# Patient Record
Sex: MALE | Race: WHITE | NOT HISPANIC OR LATINO | Employment: UNEMPLOYED | URBAN - METROPOLITAN AREA
[De-identification: names, ages, dates, MRNs, and addresses within clinical notes are randomized per-mention and may not be internally consistent; named-entity substitution may affect disease eponyms.]

---
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
## 2018-03-19 ENCOUNTER — HOSPITAL ENCOUNTER (EMERGENCY)
Facility: HOSPITAL | Age: 18
Discharge: HOME | End: 2018-03-19
Attending: EMERGENCY MEDICINE
Payer: MEDICAID

## 2018-03-19 VITALS
HEIGHT: 64 IN | RESPIRATION RATE: 12 BRPM | BODY MASS INDEX: 22.2 KG/M2 | DIASTOLIC BLOOD PRESSURE: 92 MMHG | WEIGHT: 130 LBS | OXYGEN SATURATION: 100 % | TEMPERATURE: 98 F | HEART RATE: 60 BPM | SYSTOLIC BLOOD PRESSURE: 136 MMHG

## 2018-03-19 DIAGNOSIS — R07.9 CHEST PAIN: Primary | ICD-10-CM

## 2018-03-19 PROCEDURE — 93005 ELECTROCARDIOGRAM TRACING: CPT

## 2018-03-19 PROCEDURE — 99284 EMERGENCY DEPT VISIT MOD MDM: CPT

## 2018-03-19 PROCEDURE — 25000003 PHARM REV CODE 250: Performed by: PHYSICIAN ASSISTANT

## 2018-03-19 RX ADMIN — LIDOCAINE HYDROCHLORIDE: 20 SOLUTION ORAL; TOPICAL at 10:03

## 2018-03-19 NOTE — ED NOTES
"C/o chest tightness and "burning" pain behind his chest for the past 3.5 hrs after waking up and drinking fluids including blue icee which made it worse. Denies other S/SX. Even non labored respirations. Friend remains at bedside aware to notify nurse of needs or concerns.   "

## 2018-03-19 NOTE — ED PROVIDER NOTES
"Encounter Date: 3/19/2018       History     Chief Complaint   Patient presents with    Chest Pain     R anterior chest since 0630 today.     Patient is a 18 year old male who presents with chest pain for 3.5 hours PTA. He reports no PMH. He states he woke up feeling thirsty and felt some mild chest pain. He drank something and the "pain progressed". He reports "It feels like a burning behind my chest". He states it has been constant since then. He states mild shortness of breath. He denied family history of heart disease. He denied leg swelling, calf tenderness, fever, chills, cough or recent injury. He denied any allevaiting or exacerbating symptoms.          Review of patient's allergies indicates:   Allergen Reactions    Cefzil [cefprozil] Hives and Rash    Pcn [penicillins] Hives and Rash    Sulfa (sulfonamide antibiotics) Hives and Rash     History reviewed. No pertinent past medical history.  Past Surgical History:   Procedure Laterality Date    tubes in ears  2000     History reviewed. No pertinent family history.  Social History   Substance Use Topics    Smoking status: Current Every Day Smoker     Types: Vaping with nicotine    Smokeless tobacco: Never Used    Alcohol use No     Review of Systems   Constitutional: Negative for activity change, appetite change, chills and fever.   HENT: Negative for congestion, rhinorrhea and sore throat.    Eyes: Negative for redness and visual disturbance.   Respiratory: Positive for chest tightness and shortness of breath. Negative for cough.    Cardiovascular: Negative for chest pain, palpitations and leg swelling.   Gastrointestinal: Negative for abdominal pain, diarrhea, nausea and vomiting.   Genitourinary: Negative for dysuria and frequency.   Musculoskeletal: Negative for back pain, neck pain and neck stiffness.   Skin: Negative for rash.   Neurological: Negative for dizziness, syncope, numbness and headaches.       Physical Exam     Initial Vitals [03/19/18 " "0949]   BP Pulse Resp Temp SpO2   (!) 136/92 60 12 98.2 °F (36.8 °C) 100 %      MAP       106.67         Physical Exam    Constitutional: Vital signs are normal. He appears well-developed and well-nourished. He is cooperative.  Non-toxic appearance. He does not have a sickly appearance.   HENT:   Head: Normocephalic and atraumatic.   Right Ear: External ear normal.   Left Ear: External ear normal.   Nose: Nose normal.   Mouth/Throat: Oropharynx is clear and moist.   Eyes: Conjunctivae and lids are normal. Pupils are equal, round, and reactive to light.   Neck: Normal range of motion and full passive range of motion without pain. Neck supple.   Cardiovascular: Normal rate, regular rhythm and normal heart sounds. Exam reveals no gallop and no friction rub.    No murmur heard.  Pulmonary/Chest: Breath sounds normal. He has no wheezes. He has no rhonchi. He has no rales.   Abdominal: Soft. Normal appearance. There is no tenderness. There is no rigidity, no rebound and no guarding.   Musculoskeletal:   No lower extremity swelling. No calf tenderness. Negative homans sign bilaterally.    Neurological: He is alert and oriented to person, place, and time.   Skin: Skin is warm, dry and intact. No rash noted.         ED Course   Procedures  Labs Reviewed - No data to display          Medical Decision Making:   History:   I obtained history from: someone other than patient.  Old Medical Records: I decided to obtain old medical records.  Clinical Tests:   Radiological Study: Ordered and Reviewed  Medical Tests: Ordered and Reviewed       APC / Resident Notes:   Emergent evaluation of a 18-year-old male who presents with midsternal chest pain that feels like a "burning behind my heart".  He reports mild shortness of breath.  He is well-appearing.  He is not to.  His oxygen saturation is stable.  His breath sounds are clear and equal bilaterally.  I doubt pneumonia or pneumothorax.  EKG shows no acute changes.  Chest x-ray is " negative.  He was given a GI cocktail with resolution of his symptoms.  He denied family history of sudden cardiac death at a young age.  I doubt ACS.  Will start patient on Zantac. Discussed results with patient. Return precautions given. Based on my clinical evaluation, I do not appreciate any immediate, emergent, or life threatening condition or etiology that warrants additional workup today and feel that the patient can be discharged with close follow up care.  Patient is to follow up with their primary care provider. Case was discussed with Dr. Garcia who is in agreement with the plan of care. All questions answered.            Attending Attestation:     Physician Attestation Statement for NP/PA:   I discussed this assessment and plan of this patient with the NP/PA, but I did not personally examine the patient. The face to face encounter was performed by the NP/PA.                     Clinical Impression:   The encounter diagnosis was Chest pain.                           EBONY GrullonC  03/19/18 1552       Jomar Garcia MD  03/23/18 0789

## 2019-02-24 PROBLEM — M25.532 PAIN IN BOTH WRISTS: Status: ACTIVE | Noted: 2019-02-24

## 2019-02-24 PROBLEM — M25.531 PAIN IN BOTH WRISTS: Status: ACTIVE | Noted: 2019-02-24

## 2019-02-24 PROBLEM — G56.03 CARPAL TUNNEL SYNDROME, BILATERAL: Status: ACTIVE | Noted: 2019-02-24

## 2020-04-03 ENCOUNTER — TELEPHONE (OUTPATIENT)
Dept: FAMILY MEDICINE | Facility: CLINIC | Age: 20
End: 2020-04-03

## 2020-04-03 NOTE — TELEPHONE ENCOUNTER
----- Message from Mauro Edwards sent at 4/3/2020  9:04 AM CDT -----  Contact: pt   Type: Needs Medical Advice    Who Called:  Pt   Symptoms (please be specific):    How long has patient had these symptoms:    Pharmacy name and phone #:    Best Call Back Number: [phone]   Additional Information: need a referral for a hand specialist

## 2020-09-08 PROBLEM — G56.01 CARPAL TUNNEL SYNDROME, RIGHT: Status: ACTIVE | Noted: 2020-09-08

## 2021-06-30 ENCOUNTER — OFFICE VISIT (OUTPATIENT)
Dept: FAMILY MEDICINE | Facility: CLINIC | Age: 21
End: 2021-06-30
Payer: MEDICAID

## 2021-06-30 VITALS
SYSTOLIC BLOOD PRESSURE: 128 MMHG | BODY MASS INDEX: 25.77 KG/M2 | WEIGHT: 154.69 LBS | HEART RATE: 68 BPM | HEIGHT: 65 IN | DIASTOLIC BLOOD PRESSURE: 88 MMHG | OXYGEN SATURATION: 98 %

## 2021-06-30 DIAGNOSIS — M25.551 RIGHT HIP PAIN: Primary | ICD-10-CM

## 2021-06-30 PROCEDURE — 99204 OFFICE O/P NEW MOD 45 MIN: CPT | Performed by: NURSE PRACTITIONER

## 2021-06-30 PROCEDURE — 99213 OFFICE O/P EST LOW 20 MIN: CPT | Mod: S$PBB,,, | Performed by: NURSE PRACTITIONER

## 2021-06-30 PROCEDURE — 99213 PR OFFICE/OUTPT VISIT, EST, LEVL III, 20-29 MIN: ICD-10-PCS | Mod: S$PBB,,, | Performed by: NURSE PRACTITIONER

## 2024-12-09 ENCOUNTER — LAB VISIT (OUTPATIENT)
Dept: LAB | Facility: HOSPITAL | Age: 24
End: 2024-12-09
Payer: COMMERCIAL

## 2024-12-09 ENCOUNTER — OFFICE VISIT (OUTPATIENT)
Dept: FAMILY MEDICINE | Facility: CLINIC | Age: 24
End: 2024-12-09
Payer: COMMERCIAL

## 2024-12-09 VITALS
HEART RATE: 48 BPM | DIASTOLIC BLOOD PRESSURE: 76 MMHG | OXYGEN SATURATION: 99 % | HEIGHT: 65 IN | WEIGHT: 162.25 LBS | BODY MASS INDEX: 27.03 KG/M2 | RESPIRATION RATE: 18 BRPM | SYSTOLIC BLOOD PRESSURE: 122 MMHG

## 2024-12-09 DIAGNOSIS — G89.29 CHRONIC PAIN OF BOTH HIPS: ICD-10-CM

## 2024-12-09 DIAGNOSIS — R03.0 ELEVATED BLOOD PRESSURE READING WITHOUT DIAGNOSIS OF HYPERTENSION: ICD-10-CM

## 2024-12-09 DIAGNOSIS — M25.552 CHRONIC PAIN OF BOTH HIPS: ICD-10-CM

## 2024-12-09 DIAGNOSIS — R00.1 BRADYCARDIA: ICD-10-CM

## 2024-12-09 DIAGNOSIS — Z00.00 WELLNESS EXAMINATION: Primary | ICD-10-CM

## 2024-12-09 DIAGNOSIS — M25.551 CHRONIC PAIN OF BOTH HIPS: ICD-10-CM

## 2024-12-09 DIAGNOSIS — G43.719 INTRACTABLE CHRONIC MIGRAINE WITHOUT AURA AND WITHOUT STATUS MIGRAINOSUS: ICD-10-CM

## 2024-12-09 DIAGNOSIS — Z00.00 WELLNESS EXAMINATION: ICD-10-CM

## 2024-12-09 LAB
ALBUMIN SERPL BCP-MCNC: 4 G/DL (ref 3.5–5.2)
ALP SERPL-CCNC: 41 U/L (ref 40–150)
ALT SERPL W/O P-5'-P-CCNC: 14 U/L (ref 10–44)
ANION GAP SERPL CALC-SCNC: 11 MMOL/L (ref 8–16)
AST SERPL-CCNC: 20 U/L (ref 10–40)
BASOPHILS # BLD AUTO: 0.05 K/UL (ref 0–0.2)
BASOPHILS NFR BLD: 0.7 % (ref 0–1.9)
BILIRUB SERPL-MCNC: 0.5 MG/DL (ref 0.1–1)
BUN SERPL-MCNC: 17 MG/DL (ref 6–20)
CALCIUM SERPL-MCNC: 9.1 MG/DL (ref 8.7–10.5)
CHLORIDE SERPL-SCNC: 106 MMOL/L (ref 95–110)
CHOLEST SERPL-MCNC: 176 MG/DL (ref 120–199)
CHOLEST/HDLC SERPL: 3.6 {RATIO} (ref 2–5)
CO2 SERPL-SCNC: 25 MMOL/L (ref 23–29)
CREAT SERPL-MCNC: 1 MG/DL (ref 0.5–1.4)
DIFFERENTIAL METHOD BLD: ABNORMAL
EOSINOPHIL # BLD AUTO: 0.2 K/UL (ref 0–0.5)
EOSINOPHIL NFR BLD: 2.2 % (ref 0–8)
ERYTHROCYTE [DISTWIDTH] IN BLOOD BY AUTOMATED COUNT: 11.9 % (ref 11.5–14.5)
EST. GFR  (NO RACE VARIABLE): >60 ML/MIN/1.73 M^2
ESTIMATED AVG GLUCOSE: 97 MG/DL (ref 68–131)
GLUCOSE SERPL-MCNC: 85 MG/DL (ref 70–110)
HBA1C MFR BLD: 5 % (ref 4–5.6)
HCT VFR BLD AUTO: 43.1 % (ref 40–54)
HDLC SERPL-MCNC: 49 MG/DL (ref 40–75)
HDLC SERPL: 27.8 % (ref 20–50)
HGB BLD-MCNC: 14.5 G/DL (ref 14–18)
IMM GRANULOCYTES # BLD AUTO: 0.03 K/UL (ref 0–0.04)
IMM GRANULOCYTES NFR BLD AUTO: 0.4 % (ref 0–0.5)
LDLC SERPL CALC-MCNC: 93.4 MG/DL (ref 63–159)
LYMPHOCYTES # BLD AUTO: 2.1 K/UL (ref 1–4.8)
LYMPHOCYTES NFR BLD: 28 % (ref 18–48)
MCH RBC QN AUTO: 31.8 PG (ref 27–31)
MCHC RBC AUTO-ENTMCNC: 33.6 G/DL (ref 32–36)
MCV RBC AUTO: 95 FL (ref 82–98)
MONOCYTES # BLD AUTO: 0.6 K/UL (ref 0.3–1)
MONOCYTES NFR BLD: 8.8 % (ref 4–15)
NEUTROPHILS # BLD AUTO: 4.4 K/UL (ref 1.8–7.7)
NEUTROPHILS NFR BLD: 59.9 % (ref 38–73)
NONHDLC SERPL-MCNC: 127 MG/DL
NRBC BLD-RTO: 0 /100 WBC
PLATELET # BLD AUTO: 230 K/UL (ref 150–450)
PMV BLD AUTO: 10.3 FL (ref 9.2–12.9)
POTASSIUM SERPL-SCNC: 3.7 MMOL/L (ref 3.5–5.1)
PROT SERPL-MCNC: 6.6 G/DL (ref 6–8.4)
RBC # BLD AUTO: 4.56 M/UL (ref 4.6–6.2)
SODIUM SERPL-SCNC: 142 MMOL/L (ref 136–145)
TRIGL SERPL-MCNC: 168 MG/DL (ref 30–150)
TSH SERPL DL<=0.005 MIU/L-ACNC: 0.67 UIU/ML (ref 0.4–4)
WBC # BLD AUTO: 7.31 K/UL (ref 3.9–12.7)

## 2024-12-09 PROCEDURE — 80061 LIPID PANEL: CPT

## 2024-12-09 PROCEDURE — 93005 ELECTROCARDIOGRAM TRACING: CPT | Mod: S$GLB,,,

## 2024-12-09 PROCEDURE — 84443 ASSAY THYROID STIM HORMONE: CPT

## 2024-12-09 PROCEDURE — 3078F DIAST BP <80 MM HG: CPT | Mod: CPTII,S$GLB,,

## 2024-12-09 PROCEDURE — 85025 COMPLETE CBC W/AUTO DIFF WBC: CPT

## 2024-12-09 PROCEDURE — 99385 PREV VISIT NEW AGE 18-39: CPT | Mod: S$GLB,,,

## 2024-12-09 PROCEDURE — 1160F RVW MEDS BY RX/DR IN RCRD: CPT | Mod: CPTII,S$GLB,,

## 2024-12-09 PROCEDURE — 1159F MED LIST DOCD IN RCRD: CPT | Mod: CPTII,S$GLB,,

## 2024-12-09 PROCEDURE — 80053 COMPREHEN METABOLIC PANEL: CPT

## 2024-12-09 PROCEDURE — 83036 HEMOGLOBIN GLYCOSYLATED A1C: CPT

## 2024-12-09 PROCEDURE — 4010F ACE/ARB THERAPY RXD/TAKEN: CPT | Mod: CPTII,S$GLB,,

## 2024-12-09 PROCEDURE — 3008F BODY MASS INDEX DOCD: CPT | Mod: CPTII,S$GLB,,

## 2024-12-09 PROCEDURE — 3074F SYST BP LT 130 MM HG: CPT | Mod: CPTII,S$GLB,,

## 2024-12-09 PROCEDURE — 93010 ELECTROCARDIOGRAM REPORT: CPT | Mod: S$GLB,,, | Performed by: INTERNAL MEDICINE

## 2024-12-09 PROCEDURE — 99999 PR PBB SHADOW E&M-NEW PATIENT-LVL IV: CPT | Mod: PBBFAC,,,
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
## 2024-12-10 ENCOUNTER — TELEPHONE (OUTPATIENT)
Dept: FAMILY MEDICINE | Facility: CLINIC | Age: 24
End: 2024-12-10
Payer: COMMERCIAL

## 2024-12-10 DIAGNOSIS — M25.512 LEFT SHOULDER PAIN, UNSPECIFIED CHRONICITY: Primary | ICD-10-CM

## 2024-12-10 LAB
OHS QRS DURATION: 94 MS
OHS QTC CALCULATION: 372 MS

## 2024-12-10 NOTE — TELEPHONE ENCOUNTER
Spoke with Mr. Cary in regards to his lab results, and he was wondering if he could have a x-ray completed for his left shoulder if possible.

## 2024-12-11 ENCOUNTER — TELEPHONE (OUTPATIENT)
Dept: FAMILY MEDICINE | Facility: CLINIC | Age: 24
End: 2024-12-11
Payer: COMMERCIAL

## 2024-12-11 NOTE — TELEPHONE ENCOUNTER
Patient advised xray of shoulder has been ordered.  Also advised appointment has been scheduled for the date of 12/13/24 at 10:15 AM.  Patient verbalized understanding.

## 2024-12-11 NOTE — TELEPHONE ENCOUNTER
----- Message from Juliocesar sent at 12/11/2024  9:14 AM CST -----  Contact: self  Type:  Patient Returning Call    Who Called:  PT  Who Left Message for Patient:  Laura  Does the patient know what this is regarding?:  yes  Best Call Back Number:  [phone]   Additional Information:

## 2024-12-13 ENCOUNTER — HOSPITAL ENCOUNTER (OUTPATIENT)
Dept: RADIOLOGY | Facility: CLINIC | Age: 24
Discharge: HOME | End: 2024-12-13
Payer: COMMERCIAL

## 2024-12-13 DIAGNOSIS — G89.29 CHRONIC PAIN OF BOTH HIPS: ICD-10-CM

## 2024-12-13 DIAGNOSIS — M25.552 CHRONIC PAIN OF BOTH HIPS: ICD-10-CM

## 2024-12-13 DIAGNOSIS — M25.512 LEFT SHOULDER PAIN, UNSPECIFIED CHRONICITY: ICD-10-CM

## 2024-12-13 DIAGNOSIS — M25.551 CHRONIC PAIN OF BOTH HIPS: ICD-10-CM

## 2024-12-13 PROCEDURE — 73521 X-RAY EXAM HIPS BI 2 VIEWS: CPT | Mod: 26,,, | Performed by: RADIOLOGY

## 2024-12-13 PROCEDURE — 73521 X-RAY EXAM HIPS BI 2 VIEWS: CPT | Mod: TC,FY,PO

## 2024-12-13 PROCEDURE — 73030 X-RAY EXAM OF SHOULDER: CPT | Mod: TC,FY,PO,LT

## 2024-12-13 PROCEDURE — 73030 X-RAY EXAM OF SHOULDER: CPT | Mod: 26,LT,, | Performed by: RADIOLOGY

## 2025-01-07 ENCOUNTER — OFFICE VISIT (OUTPATIENT)
Dept: NEUROLOGY | Facility: CLINIC | Age: 25
End: 2025-01-07
Payer: COMMERCIAL

## 2025-01-07 VITALS
HEART RATE: 58 BPM | HEIGHT: 65 IN | SYSTOLIC BLOOD PRESSURE: 148 MMHG | RESPIRATION RATE: 17 BRPM | WEIGHT: 156.63 LBS | TEMPERATURE: 99 F | DIASTOLIC BLOOD PRESSURE: 82 MMHG | BODY MASS INDEX: 26.1 KG/M2

## 2025-01-07 DIAGNOSIS — G43.719 INTRACTABLE CHRONIC MIGRAINE WITHOUT AURA AND WITHOUT STATUS MIGRAINOSUS: ICD-10-CM

## 2025-01-07 PROCEDURE — 99999 PR PBB SHADOW E&M-EST. PATIENT-LVL IV: CPT | Mod: PBBFAC,,, | Performed by: NURSE PRACTITIONER

## 2025-01-07 RX ORDER — IBUPROFEN 200 MG
200 TABLET ORAL EVERY 6 HOURS PRN
COMMUNITY

## 2025-01-07 RX ORDER — AMITRIPTYLINE HYDROCHLORIDE 10 MG/1
10 TABLET, FILM COATED ORAL NIGHTLY
Qty: 30 TABLET | Refills: 11 | Status: SHIPPED | OUTPATIENT
Start: 2025-01-07 | End: 2026-01-07

## 2025-01-07 RX ORDER — SUMATRIPTAN SUCCINATE 50 MG/1
50 TABLET ORAL
Qty: 10 TABLET | Refills: 11 | Status: SHIPPED | OUTPATIENT
Start: 2025-01-07 | End: 2025-02-06

## 2025-01-07 NOTE — PATIENT INSTRUCTIONS
Please call our clinic at [phone] or send a message on the [email or web address] portal if there are any changes to the plan described below, for example,if you are not contacted for the requested tests, referral(s) within one week, if you are unable to receive the medications prescribed, or if you feel you need to change the treatment course for any reason.     TESTING: MRI Brain with and without contrast due to increase in frequency, intensity and duration of headache.     REFERRALS: none     PREVENTION (use daily regardless of headache):  - Start Magnesium in ONE of the following preparations -               1. Magnesium oxide 800mg nightly (the most common over the counter kind, may causes loose stools)              2. Magnesium citrate 400-500mg nightly (harder to find, but more neutral on the bowels)              3. Magnesium glycinate 400mg nightly (hardest to find, look online, but most bowel-neutral, best absorbed)   - Start Riboflavin and CoQ10 daily   - Start Elavil 10 mg nightly, consider ongoing titration in the future. Caution with drowsiness   - Consider avoiding medication with addictive potential due to family history of abuse.     AS-NEEDED TREATMENT (use total no more than 10 days per month unless otherwise stated):  - Start Imitrex 50 mg as needed. Ok to repeat dose 2 hours later. No more than 200 mg per 24 hours.   - Ok to continue over the counter medication, no more than 2-3 times per week    OTHER:   - Headache journal

## 2025-01-07 NOTE — PROGRESS NOTES
Date of service: 1/7/2025  Referring provider: Bart Leary    Subjective:      Chief complaint: Headache       Patient ID: Bart Cary is a 24 y.o. who presents today as a new patient for headache.     History of Present Illness  ORIGINAL HEADACHE HISTORY -   Age at onset and course over time: high school with gradual progression over the last two years. Today, he reports headaches 3+ times per week, often waking up with a headache. He works as an .     Location: frontal, vertex   Quality:  [] Stabbing [x] Pressure [] Tight [] Throbbing/pounding [x] Sharp    Duration: [] Seconds [] Minutes [x] Hours [] Days [] Constant   Frequency: [] Daily [x] Weekly 5x  [] Monthly   How many days per month is your head or neck 100% pain free:   Headaches awaken at night?:   no   Worst time of day: upon waking, mid-day, evening   Intensity of pain: at best 1/10, at worst 10/10   Associated with: [x] Photophobia [x]  Phonophobia [] Osmophobia [] Loss of appetite [] Nausea [] Vomiting   [] Dizziness [] Vertigo [] Ringing in the ears [] Blurry vision [] Double vision  [] Anxiety/Anger/Irritability [] Problems with concentration [] Problems with memory [] Problems with task completion   [] Problems with relaxation [] Neck tightness/ neck pain [] Nasal congestion [] Nasal or sinus pressure [] Aura   Alleviated by:  [x] Sleep [] Darkness [x] Local pressure [x] Massage [] Heat [] Ice [] Menses [] Medication  Exacerbated by:  [] Fatigue [x] Light [x] Noise [] Smells [x] Coughing [] Sneezing  [] Bending over [] Change in weather [] Ovulation [] Menses [] Alcohol [x] Stress []  Food  Ipsilateral autonomic: [] nasal congestion [] lacrimation [] ptosis [] injection [] edema [] foreign body sensation [] ear fullness   ICP:  [] transient visual obscurations  [] tinnitus   [] positional headache  [] non-positional     Bowl Habits: [x] Normal [] Constipation [] Diarrhea   Caffeine intake: 1-2 drinks - coffee or red bull, Dr.  Pepper x 2 per day   Sleep habits: good   Water intake: 6 bottles per day    Eye Exam: up to date   Family history of migraine: none   Gyn status (if female) (birth control with estrogen, hysterectomy): n/a   History of asthma, cancer, glaucoma, kidney stones, CVA and osteoporosis:     HIT 6: 50    Current acute treatment:  Ibuprofen  Aleve     Current prevention:  None    Previously tried/failed acute treatment:  Tylenol  Aspirin  Motrin    Previously tried/failed preventative treatment:  None     Considerations:     Review of patient's allergies indicates:   Allergen Reactions    Cefzil [cefprozil] Hives and Rash    Pcn [penicillins] Hives and Rash    Sulfa (sulfonamide antibiotics) Hives and Rash     Current Outpatient Medications   Medication Sig Dispense Refill    ibuprofen (ADVIL,MOTRIN) 200 MG tablet Take 200 mg by mouth every 6 (six) hours as needed for Pain.      amitriptyline (ELAVIL) 10 MG tablet Take 1 tablet (10 mg total) by mouth every evening. 30 tablet 11    sumatriptan (IMITREX) 50 MG tablet Take 1 tablet (50 mg total) by mouth as needed for Migraine. 10 tablet 11     No current facility-administered medications for this visit.       Past Medical History  History reviewed. No pertinent past medical history.    Past Surgical History  Past Surgical History:   Procedure Laterality Date    CARPAL TUNNEL RELEASE Right 09/08/2020    right CTR    CARPAL TUNNEL RELEASE Right 9/8/2020    Procedure: RELEASE, CARPAL TUNNEL;  Surgeon: Adarsh Rodriguez MD;  Location: [facility];  Service: Orthopedics;  Laterality: Right;    TONSILLECTOMY      tubes in ears  2000    WISOM TEETH         Family History  No family history on file.    Social History  Social History     Socioeconomic History    Marital status: Single   Tobacco Use    Smoking status: Every Day     Types: Vaping with nicotine    Smokeless tobacco: Never    Tobacco comments:     VAPING DAILY   Substance and Sexual Activity    Alcohol use: No    Drug use:  Yes     Types: Marijuana    Sexual activity: Yes     Partners: Female     Birth control/protection: None   Social History Narrative    [place]ives [place]/ dad in [place]; non-smoking;      Social Drivers of Health     Financial Resource Strain: Medium Risk (1/7/2025)    Overall Financial Resource Strain (CARDIA)     Difficulty of Paying Living Expenses: Somewhat hard   Food Insecurity: No Food Insecurity (1/7/2025)    Hunger Vital Sign     Worried About Running Out of Food in the Last Year: Never true     Ran Out of Food in the Last Year: Never true   Physical Activity: Sufficiently Active (1/7/2025)    Exercise Vital Sign     Days of Exercise per Week: 7 days     Minutes of Exercise per Session: 90 min   Stress: No Stress Concern Present (1/7/2025)    [employer] - Occupational Stress Questionnaire     Feeling of Stress : Not at all   Housing Stability: Unknown (1/7/2025)    Housing Stability Vital Sign     Unable to Pay for Housing in the Last Year: No        Review of Systems  14-point review of systems as follows:   No check zee indicates NEGATIVE response   Constitutional: [] weight loss [x] change to appetite   Eyes: [] change in vision [] double vision   Ears, nose, mouth, throat: [] frequent nose bleeds [] ringing in the ears   Respiratory: [] cough [] wheezing   Cardiovascular: [] chest pain [] palpitations   Gastrointestinal: [] jaundice [] nausea/vomiting   Genitourinary: [] incontinence [] burning with urination   Hematologic/lymphatic: [] easy bruising/bleeding [] night sweats   Neurological: [] numbness [] weakness   Endocrine: [] fatigue [] heat/cold intolerance   Allergy/Immunologic: [] fevers [] chills   Musculoskeletal: [x] muscle pain [] joint pain   Psychiatric: [] thoughts of harming self/others [] depression   Integumentary: [] rashes [x] sores that do not heal     Objective:        Vitals:    01/07/25 1109   BP: (!) 148/82   Pulse: (!) 58   Resp: 17   Temp: 98.7 °F (37.1  °C)     Body mass index is 26.07 kg/m².    Constitutional: appears in no acute distress, well-developed, well-nourished     Eyes: normal conjunctiva, PERRLA    Ears, nose, mouth, throat: external appearance of ears and nose normal, hearing intact     Cardiovascular: n/a     Respiratory: unlabored respirations    Gastrointestinal: no visible abdominal masses, no guarding, no visible hernia    Musculoskeletal: normal tone in all four extremities. No abnormal movements. No pronator drift. No orbit. Symmetric finger tapping. Normal station. Normal regular gait.       Spine:   CERVICAL SPINE:  ROM: normal   MUSCLE SPASM: no   FACET LOADING: no   SPURLING: no  IRMA / SILVIA tender: no     Psychiatric: normal judgment and insight. Oriented to person, place, and time.     Neurologic:   Cortical functions: recent and remote memory intact, normal attention span and concentration, speech fluent, adequate fund of knowledge   Cranial nerves: visual fields full, PERRLA, EOMI, symmetric facial strength, hearing intact, palate elevates symmetrically, shoulder shrug 5/5, tongue protrudes midline   Reflexes: 2+ in the upper and lower extremities, no Blakely  Sensation: intact to temperature throughout   Coordination: normal finger to nose, heel to shin, tandem gait     Data Review:     I have personally reviewed the referring provider's notes, labs, & imaging made available to me today.      RADIOLOGY STUDIES:  I have personally reviewed the pertinent images performed.       No results found for this or any previous visit.    Lab Results   Component Value Date     12/09/2024    K 3.7 12/09/2024     12/09/2024    CO2 25 12/09/2024    BUN 17 12/09/2024    CREATININE 1.0 12/09/2024    GLU 85 12/09/2024    HGBA1C 5.0 12/09/2024    AST 20 12/09/2024    ALT 14 12/09/2024    ALBUMIN 4.0 12/09/2024    PROT 6.6 12/09/2024    BILITOT 0.5 12/09/2024    CHOL 176 12/09/2024    HDL 49 12/09/2024    LDLCALC 93.4 12/09/2024    TRIG 168 (H)  12/09/2024       Lab Results   Component Value Date    WBC 7.31 12/09/2024    HGB 14.5 12/09/2024    HCT 43.1 12/09/2024    MCV 95 12/09/2024     12/09/2024       Lab Results   Component Value Date    TSH 0.670 12/09/2024           Assessment & Plan:       Problem List Items Addressed This Visit       Intractable chronic migraine without aura and without status migrainosus    Overview     Headaches are typically moderate to severe in intensity, worsen with activity, pounding in quality and associated with sensitivity to light and sound.     MRI Brain with and without contrast due to increase in frequency, intensity and duration of headache    Start Magnesium, Riboflavin and CoQ10 daily. Start Elavil 10 mg nightly, consider ongoing titration in the future. Caution with drowsiness. Consider avoiding medication with addictive potential due to family history of abuse. Start Imitrex 50 mg as needed. Ok to repeat dose 2 hours later. No more than 200 mg per 24 hours. Ok to continue over the counter medication, no more than 2-3 times per week.  Headache journal            Relevant Medications    amitriptyline (ELAVIL) 10 MG tablet    sumatriptan (IMITREX) 50 MG tablet    Other Relevant Orders    MRI Brain W WO Contrast           Please call our clinic at [phone] or send a message on the [employer] portal if there are any changes to the plan described below, for example,if you are not contacted for the requested tests, referral(s) within one week, if you are unable to receive the medications prescribed, or if you feel you need to change the treatment course for any reason.     TESTING: MRI Brain with and without contrast due to increase in frequency, intensity and duration of headache.     REFERRALS: none     PREVENTION (use daily regardless of headache):  - Start Magnesium in ONE of the following preparations -               1. Magnesium oxide 800mg nightly (the most common over the counter kind, may causes loose  stools)              2. Magnesium citrate 400-500mg nightly (harder to find, but more neutral on the bowels)              3. Magnesium glycinate 400mg nightly (hardest to find, look online, but most bowel-neutral, best absorbed)   - Start Riboflavin and CoQ10 daily   - Start Elavil 10 mg nightly, consider ongoing titration in the future. Caution with drowsiness   - Consider avoiding medication with addictive potential due to family history of abuse.     AS-NEEDED TREATMENT (use total no more than 10 days per month unless otherwise stated):  - Start Imitrex 50 mg as needed. Ok to repeat dose 2 hours later. No more than 200 mg per 24 hours.   - Ok to continue over the counter medication, no more than 2-3 times per week    OTHER:   - Headache journal     Follow up in about 6 weeks (around 2/18/2025) for Virtual Visit.       BOAZ GastelumC      I have spent 60 minutes of total time on the total encounter which includes face to face time and non-face to face time preparing to see the patient (eg, review of labs, previous encounters, care everywhere), obtaining and/or reviewing separately obtained history, documenting clinical information in the electronic or health record, independently interpreting results, and communicating results to the patient/family/caregiver, or care coordination.

## 2025-01-17 ENCOUNTER — HOSPITAL ENCOUNTER (OUTPATIENT)
Dept: RADIOLOGY | Facility: HOSPITAL | Age: 25
Discharge: HOME | End: 2025-01-17
Attending: NURSE PRACTITIONER
Payer: COMMERCIAL

## 2025-01-17 DIAGNOSIS — G43.719 INTRACTABLE CHRONIC MIGRAINE WITHOUT AURA AND WITHOUT STATUS MIGRAINOSUS: ICD-10-CM

## 2025-01-17 PROCEDURE — A9585 GADOBUTROL INJECTION: HCPCS | Mod: PO | Performed by: NURSE PRACTITIONER

## 2025-01-17 PROCEDURE — 25500020 PHARM REV CODE 255: Mod: PO | Performed by: NURSE PRACTITIONER

## 2025-01-17 PROCEDURE — 70553 MRI BRAIN STEM W/O & W/DYE: CPT | Mod: TC,PO

## 2025-01-17 PROCEDURE — 70553 MRI BRAIN STEM W/O & W/DYE: CPT | Mod: 26,,, | Performed by: RADIOLOGY

## 2025-01-17 RX ORDER — GADOBUTROL 604.72 MG/ML
7 INJECTION INTRAVENOUS
Status: COMPLETED | OUTPATIENT
Start: 2025-01-17 | End: 2025-01-17

## 2025-01-17 RX ADMIN — GADOBUTROL 7 ML: 604.72 INJECTION INTRAVENOUS at 08:01

## 2025-02-26 ENCOUNTER — OFFICE VISIT (OUTPATIENT)
Dept: NEUROLOGY | Facility: CLINIC | Age: 25
End: 2025-02-26
Payer: COMMERCIAL

## 2025-02-26 DIAGNOSIS — G43.719 INTRACTABLE CHRONIC MIGRAINE WITHOUT AURA AND WITHOUT STATUS MIGRAINOSUS: Primary | ICD-10-CM

## 2025-02-26 RX ORDER — AMITRIPTYLINE HYDROCHLORIDE 25 MG/1
25 TABLET, FILM COATED ORAL NIGHTLY
Qty: 30 TABLET | Refills: 11 | Status: SHIPPED | OUTPATIENT
Start: 2025-02-26 | End: 2026-02-26

## 2025-02-26 RX ORDER — SUMATRIPTAN SUCCINATE 50 MG/1
50 TABLET ORAL
Qty: 10 TABLET | Refills: 11 | Status: SHIPPED | OUTPATIENT
Start: 2025-02-26 | End: 2025-03-28

## 2025-02-26 NOTE — PATIENT INSTRUCTIONS
Please call our clinic at [phone] or send a message on the [employer] portal if there are any changes to the plan described below, for example,if you are not contacted for the requested tests, referral(s) within one week, if you are unable to receive the medications prescribed, or if you feel you need to change the treatment course for any reason.     TESTING: none     REFERRALS: none     PREVENTION (use daily regardless of headache):  - Continue Magnesium glycinate 400mg nightly    - Continue Riboflavin and CoQ10 daily   - Increase Elavil to 25 mg nightly, consider ongoing titration in the future. Caution with drowsiness   - Consider avoiding medication with addictive potential due to family history of abuse.     AS-NEEDED TREATMENT (use total no more than 10 days per month unless otherwise stated):  - Continue Imitrex 50 mg as needed. Ok to repeat dose 2 hours later. No more than 200 mg per 24 hours.   - Ok to continue over the counter medication, no more than 2-3 times per week    OTHER:   - Headache journal

## 2025-02-26 NOTE — PROGRESS NOTES
Date of service: 2/26/2025  Referring provider: No ref. provider found    Subjective:      Chief complaint: Headache       Patient ID: Bart Cary is a 25 y.o. who presents today as a new patient for headache.     History of Present Illness  INTERVAL HISTORY: 02/26/2025.  The patient location is: home  The chief complaint leading to consultation is: follow up  Visit type: audiovisual  Face to Face time with patient: 15  20 minutes of total time spent on the encounter, which includes face to face time and non-face to face time preparing to see the patient (eg, review of tests), Obtaining and/or reviewing separately obtained history, Documenting clinical information in the electronic or other health record, Independently interpreting results (not separately reported) and communicating results to the patient/family/caregiver, or Care coordination (not separately reported).   Each patient to whom he or she provides medical services by telemedicine is:  (1) informed of the relationship between the physician and patient and the respective role of any other health care provider with respect to management of the patient; and (2) notified that he or she may decline to receive medical services by telemedicine and may withdraw from such care at any time.     Notes:   He presents today for follow up. He continues to report headaches 2-3 times per week. He takes Elavil 10 mg nightly and Imitrex or OTC medication a needed. He does report a decreased intensity with Elavil and is not waking up with as many headaches. MRI Brain non-acute. Otherwise information below is reviewed and verified with no changes made.    ORIGINAL HEADACHE HISTORY -   Age at onset and course over time: high school with gradual progression over the last two years. Today, he reports headaches 3+ times per week, often waking up with a headache. He works as an .     Location: frontal, vertex   Quality:  [] Stabbing [x] Pressure [] Tight []  Throbbing/pounding [x] Sharp    Duration: [] Seconds [] Minutes [x] Hours [] Days [] Constant   Frequency: [] Daily [x] Weekly 5x  [] Monthly   How many days per month is your head or neck 100% pain free:   Headaches awaken at night?:   no   Worst time of day: upon waking, mid-day, evening   Intensity of pain: at best 1/10, at worst 10/10   Associated with: [x] Photophobia [x]  Phonophobia [] Osmophobia [] Loss of appetite [] Nausea [] Vomiting   [] Dizziness [] Vertigo [] Ringing in the ears [] Blurry vision [] Double vision  [] Anxiety/Anger/Irritability [] Problems with concentration [] Problems with memory [] Problems with task completion   [] Problems with relaxation [] Neck tightness/ neck pain [] Nasal congestion [] Nasal or sinus pressure [] Aura   Alleviated by:  [x] Sleep [] Darkness [x] Local pressure [x] Massage [] Heat [] Ice [] Menses [] Medication  Exacerbated by:  [] Fatigue [x] Light [x] Noise [] Smells [x] Coughing [] Sneezing  [] Bending over [] Change in weather [] Ovulation [] Menses [] Alcohol [x] Stress []  Food  Ipsilateral autonomic: [] nasal congestion [] lacrimation [] ptosis [] injection [] edema [] foreign body sensation [] ear fullness   ICP:  [] transient visual obscurations  [] tinnitus   [] positional headache  [] non-positional     Bowl Habits: [x] Normal [] Constipation [] Diarrhea   Caffeine intake: 1-2 drinks - coffee or red bull, Dr. Pepper x 2 per day   Sleep habits: good   Water intake: 6 bottles per day    Eye Exam: up to date   Family history of migraine: none   Gyn status (if female) (birth control with estrogen, hysterectomy): n/a   History of asthma, cancer, glaucoma, kidney stones, CVA and osteoporosis: none     HIT 6: 50    Current acute treatment:  Ibuprofen  Aleve   Imitrex    Current prevention:  Elavil     Previously tried/failed acute treatment:  Tylenol  Aspirin  Motrin    Previously tried/failed preventative treatment:  None     Considerations:     Review of  patient's allergies indicates:   Allergen Reactions    Cefzil [cefprozil] Hives and Rash    Pcn [penicillins] Hives and Rash    Sulfa (sulfonamide antibiotics) Hives and Rash     Current Outpatient Medications   Medication Sig Dispense Refill    amitriptyline (ELAVIL) 25 MG tablet Take 1 tablet (25 mg total) by mouth every evening. 30 tablet 11    ibuprofen (ADVIL,MOTRIN) 200 MG tablet Take 200 mg by mouth every 6 (six) hours as needed for Pain.      sumatriptan (IMITREX) 50 MG tablet Take 1 tablet (50 mg total) by mouth as needed for Migraine. 10 tablet 11     No current facility-administered medications for this visit.       Past Medical History  No past medical history on file.    Past Surgical History  Past Surgical History:   Procedure Laterality Date    CARPAL TUNNEL RELEASE Right 09/08/2020    right CTR    CARPAL TUNNEL RELEASE Right 9/8/2020    Procedure: RELEASE, CARPAL TUNNEL;  Surgeon: Adarsh Rodriguez MD;  Location: [facility];  Service: Orthopedics;  Laterality: Right;    TONSILLECTOMY      tubes in ears  2000    WISOM TEETH         Family History  No family history on file.    Social History  Social History     Socioeconomic History    Marital status: Single   Tobacco Use    Smoking status: Every Day     Types: Vaping with nicotine    Smokeless tobacco: Never    Tobacco comments:     VAPING DAILY   Substance and Sexual Activity    Alcohol use: No    Drug use: Yes     Types: Marijuana    Sexual activity: Yes     Partners: Female     Birth control/protection: None   Social History Narrative    Lives w/ dad in [place]; non-smoking;      Social Drivers of Health     Financial Resource Strain: Medium Risk (2/25/2025)    Overall Financial Resource Strain (CARDIA)     Difficulty of Paying Living Expenses: Somewhat hard   Food Insecurity: Food Insecurity Present (2/25/2025)    Hunger Vital Sign     Worried About Running Out of Food in the Last Year: Never true     Ran Out of Food in the Last Year: Sometimes  true   Transportation Needs: Unmet Transportation Needs (2/25/2025)    PRAPARE - Transportation     Lack of Transportation (Medical): No     Lack of Transportation (Non-Medical): Yes   Physical Activity: Sufficiently Active (2/25/2025)    Exercise Vital Sign     Days of Exercise per Week: 6 days     Minutes of Exercise per Session: 150+ min   Stress: No Stress Concern Present (2/25/2025)    [employer] - Occupational Stress Questionnaire     Feeling of Stress : Not at all   Housing Stability: Low Risk  (2/25/2025)    Housing Stability Vital Sign     Unable to Pay for Housing in the Last Year: No     Number of Times Moved in the Last Year: 0     Homeless in the Last Year: No        Review of Systems  14-point review of systems as follows:   No check zee indicates NEGATIVE response   Constitutional: [] weight loss [x] change to appetite   Eyes: [] change in vision [] double vision   Ears, nose, mouth, throat: [] frequent nose bleeds [] ringing in the ears   Respiratory: [] cough [] wheezing   Cardiovascular: [] chest pain [] palpitations   Gastrointestinal: [] jaundice [] nausea/vomiting   Genitourinary: [] incontinence [] burning with urination   Hematologic/lymphatic: [] easy bruising/bleeding [] night sweats   Neurological: [] numbness [] weakness   Endocrine: [] fatigue [] heat/cold intolerance   Allergy/Immunologic: [] fevers [] chills   Musculoskeletal: [x] muscle pain [] joint pain   Psychiatric: [] thoughts of harming self/others [] depression   Integumentary: [] rashes [x] sores that do not heal     Objective:        There were no vitals filed for this visit.    There is no height or weight on file to calculate BMI.    General exam:  Alert, cooperative, not in distress  Normocephalic and atraumatic  Pink conjunctiva, anicteric sclera, moist mucous membranes  No cervical lymphadenopathy   No joint swelling or tenderness    Data Review:     I have personally reviewed the referring  provider's notes, labs, & imaging made available to me today.      RADIOLOGY STUDIES:  I have personally reviewed the pertinent images performed.       No results found for this or any previous visit.    Lab Results   Component Value Date     12/09/2024    K 3.7 12/09/2024     12/09/2024    CO2 25 12/09/2024    BUN 17 12/09/2024    CREATININE 1.0 12/09/2024    GLU 85 12/09/2024    HGBA1C 5.0 12/09/2024    AST 20 12/09/2024    ALT 14 12/09/2024    ALBUMIN 4.0 12/09/2024    PROT 6.6 12/09/2024    BILITOT 0.5 12/09/2024    CHOL 176 12/09/2024    HDL 49 12/09/2024    LDLCALC 93.4 12/09/2024    TRIG 168 (H) 12/09/2024       Lab Results   Component Value Date    WBC 7.31 12/09/2024    HGB 14.5 12/09/2024    HCT 43.1 12/09/2024    MCV 95 12/09/2024     12/09/2024       Lab Results   Component Value Date    TSH 0.670 12/09/2024           Assessment & Plan:       Problem List Items Addressed This Visit       Intractable chronic migraine without aura and without status migrainosus - Primary    Overview   Headaches are typically moderate to severe in intensity, worsen with activity, pounding in quality and associated with sensitivity to light and sound.     MRI Brain non- acute     Continue Magnesium, Riboflavin and CoQ10 daily. Increase Elavil to 25 mg nightly, consider ongoing titration in the future. Caution with drowsiness. Consider avoiding medication with addictive potential due to family history of abuse. Continue Imitrex 50 mg as needed. Ok to repeat dose 2 hours later. No more than 200 mg per 24 hours. Ok to continue over the counter medication, no more than 2-3 times per week.  Headache journal.          Relevant Medications    sumatriptan (IMITREX) 50 MG tablet    amitriptyline (ELAVIL) 25 MG tablet             Please call our clinic at [phone] or send a message on the [facility] portal if there are any changes to the plan described below, for example,if you are not contacted for the requested  tests, referral(s) within one week, if you are unable to receive the medications prescribed, or if you feel you need to change the treatment course for any reason.     TESTING: none     REFERRALS: none     PREVENTION (use daily regardless of headache):  - Continue Magnesium glycinate 400mg nightly    - Continue Riboflavin and CoQ10 daily   - Increase Elavil to 25 mg nightly, consider ongoing titration in the future. Caution with drowsiness   - Consider avoiding medication with addictive potential due to family history of abuse.     AS-NEEDED TREATMENT (use total no more than 10 days per month unless otherwise stated):  - Continue Imitrex 50 mg as needed. Ok to repeat dose 2 hours later. No more than 200 mg per 24 hours.   - Ok to continue over the counter medication, no more than 2-3 times per week    OTHER:   - Headache journal     Follow up in about 3 months (around 5/26/2025) for Virtual Visit.       Jennie Jean NP-C      I have spent 20 minutes of total time on the total encounter which includes face to face time and non-face to face time preparing to see the patient (eg, review of labs, previous encounters, care everywhere), obtaining and/or reviewing separately obtained history, documenting clinical information in the electronic or health record, independently interpreting results, and communicating results to the patient/family/caregiver, or care coordination.

## 2025-08-07 ENCOUNTER — OFFICE VISIT (OUTPATIENT)
Dept: URGENT CARE | Facility: CLINIC | Age: 25
End: 2025-08-07
Payer: OTHER MISCELLANEOUS

## 2025-08-07 VITALS
OXYGEN SATURATION: 98 % | HEIGHT: 63 IN | SYSTOLIC BLOOD PRESSURE: 135 MMHG | WEIGHT: 159 LBS | BODY MASS INDEX: 28.17 KG/M2 | HEART RATE: 58 BPM | TEMPERATURE: 99 F | DIASTOLIC BLOOD PRESSURE: 75 MMHG | RESPIRATION RATE: 18 BRPM

## 2025-08-07 DIAGNOSIS — Z02.6 ENCOUNTER RELATED TO WORKER'S COMPENSATION CLAIM: ICD-10-CM

## 2025-08-07 DIAGNOSIS — Z77.090 ASBESTOS EXPOSURE: Primary | ICD-10-CM

## 2025-08-07 DIAGNOSIS — F17.200 NEEDS SMOKING CESSATION EDUCATION: ICD-10-CM

## 2025-08-07 NOTE — PROGRESS NOTES
Subjective:      Patient ID: Bart Cary is a 25 y.o. male.    Chief Complaint: Asbestos Exposure    Patient's place of employment - [employer]  Patient's job title -   Date of injury - 07/14/25  Body part injured including left or right - None  Injury Mechanism - Inhalation  What they were doing when they got hurt - working inside the building  What they did immediately after - Reported  Pain scale right now - 0/10      Respiratory:  Negative for sleep apnea, chest tightness, cough, sputum production, bloody sputum, COPD, shortness of breath, wheezing and asthma.    Musculoskeletal:  Negative for pain.   Allergic/Immunologic: Negative for asthma.   Neurological:  Negative for numbness and tingling.       See MA note above. Begin MD note:    Bart Cary is a  25 y.o. presenting for evaluation of asbestos exposure.   HPI:  He was exposed to asbestos while working at [employer].  He was doing electrical work in the mechanical room, doing them on pipes.  No acute symptoms.  No history of asthma.  He is a daily vapor, but is working on cessation.    Objective:     Physical Exam  Vitals and nursing note reviewed.   Constitutional:       General: He is not in acute distress.     Appearance: He is not ill-appearing.   HENT:      Head: Normocephalic.   Eyes:      Conjunctiva/sclera: Conjunctivae normal.   Pulmonary:      Effort: Pulmonary effort is normal. No tachypnea, bradypnea, accessory muscle usage, prolonged expiration, respiratory distress or retractions.      Breath sounds: Normal breath sounds and air entry. No stridor, decreased air movement or transmitted upper airway sounds. No decreased breath sounds, wheezing, rhonchi or rales.   Skin:     General: Skin is warm.      Coloration: Skin is not pale.   Neurological:      General: No focal deficit present.      Mental Status: He is alert and oriented to person, place, and time.      GCS: GCS eye subscore is 4. GCS verbal  subscore is 5. GCS motor subscore is 6.      Motor: Motor function is intact.      Coordination: Coordination is intact.   Psychiatric:         Attention and Perception: Attention normal.         Mood and Affect: Mood normal.         Speech: Speech normal.         Behavior: Behavior normal. Behavior is cooperative.         Thought Content: Thought content normal.        Imaging  XR CHEST PA AND LATERAL  Result Date: 8/7/2025  EXAMINATION: XR CHEST PA AND LATERAL CLINICAL HISTORY: Contact with and (suspected) exposure to asbestos FINDINGS: Chest one view. Heart size is normal.  Lungs are clear and the bones bowel gas are noncontributory.     No acute process seen. Electronically signed by: Keaton Del Real MD Date:    08/07/2025 Time:    09:09    Spirometry    Assessment:      1. Asbestos exposure    2. Encounter related to worker's compensation claim      Plan:     Education on the natural history and latency of asbestos exposure provided.  Spirometry and chest x-ray completed and reviewed with patient, normal.  Counseled on vaping cessation.  Follow up as needed.           Diagnoses and plan discussed with the patient, all questions and concerns were addressed prior to discharge. Follow-up as needed if any reoccurrence of symptoms related to the present condition. Plan was developed with active input from the patient and they verbalized understanding of and agreement with the POC.   Note was dictated with voice recognition software, please excuse any grammatical errors.    I spent a total of 30 minutes on the day of the visit.  This includes face to face time and non-face to face time preparing to see the patient (eg, review of tests), obtaining and/or reviewing separately obtained history, documenting clinical information in the electronic or other health record, independently interpreting results and communicating results to the patient/family/caregiver, or care coordinator.    Work Modifications: Regular Duty,  Discharged from Occupational Health  Follow up if symptoms worsen or fail to improve.

## 2025-08-07 NOTE — LETTER
[facility]  [address] [facility] [address] [address]  Phone: [phone]  Fax: [phone]  [facility] [employer] [facility]: [phone]    Pt Name: Bart Cary  Injury Date: 07/14/2025   Employee ID: [id number] Date of First Treatment: 08/07/2025   Company: [email or web address]       Appointment Time: 08:35 AM Arrived: 8:15 am   Provider: Aimee Chavez MD Time Out:9:25 am     Office Treatment:   1. Asbestos exposure    2. Encounter related to worker's compensation claim               Work Modifications: Regular Duty, Discharged from Occupational Health     Return Appointment:   Follow up if symptoms worsen or fail to improve